# Patient Record
Sex: FEMALE | Race: WHITE | Employment: UNEMPLOYED | URBAN - METROPOLITAN AREA
[De-identification: names, ages, dates, MRNs, and addresses within clinical notes are randomized per-mention and may not be internally consistent; named-entity substitution may affect disease eponyms.]

---

## 2018-05-02 ENCOUNTER — TELEPHONE (OUTPATIENT)
Dept: CARDIOLOGY CLINIC | Age: 43
End: 2018-05-02

## 2018-05-02 RX ORDER — ASPIRIN 81 MG/1
81 TABLET ORAL
COMMUNITY
End: 2019-08-21 | Stop reason: ALTCHOICE

## 2019-08-01 ENCOUNTER — HOSPITAL ENCOUNTER (OUTPATIENT)
Age: 44
Discharge: HOME | End: 2019-08-03

## 2019-08-01 PROCEDURE — 88305 TISSUE EXAM BY PATHOLOGIST: CPT

## 2019-08-19 ENCOUNTER — TELEPHONE (OUTPATIENT)
Dept: ADMINISTRATIVE | Age: 44
End: 2019-08-19

## 2019-08-21 ENCOUNTER — OFFICE VISIT (OUTPATIENT)
Dept: CARDIOLOGY CLINIC | Age: 44
End: 2019-08-21
Payer: COMMERCIAL

## 2019-08-21 VITALS
WEIGHT: 150.5 LBS | DIASTOLIC BLOOD PRESSURE: 60 MMHG | HEIGHT: 66 IN | SYSTOLIC BLOOD PRESSURE: 122 MMHG | RESPIRATION RATE: 16 BRPM | HEART RATE: 72 BPM | BODY MASS INDEX: 24.19 KG/M2

## 2019-08-21 DIAGNOSIS — O09.529 ANTEPARTUM MULTIGRAVIDA OF ADVANCED MATERNAL AGE: Primary | ICD-10-CM

## 2019-08-21 PROCEDURE — 1036F TOBACCO NON-USER: CPT | Performed by: INTERNAL MEDICINE

## 2019-08-21 PROCEDURE — 93000 ELECTROCARDIOGRAM COMPLETE: CPT | Performed by: INTERNAL MEDICINE

## 2019-08-21 PROCEDURE — 99214 OFFICE O/P EST MOD 30 MIN: CPT | Performed by: INTERNAL MEDICINE

## 2019-08-21 PROCEDURE — G8427 DOCREV CUR MEDS BY ELIG CLIN: HCPCS | Performed by: INTERNAL MEDICINE

## 2019-08-21 PROCEDURE — G8420 CALC BMI NORM PARAMETERS: HCPCS | Performed by: INTERNAL MEDICINE

## 2019-08-21 NOTE — PROGRESS NOTES
OUTPATIENT CARDIOLOGY FOLLOW-UP    Name: Sumaya Asif    Age: 53 y.o. Primary Care Physician: Valentino Saliva, DO    Date of Service: 8/21/2019    Chief Complaint:   Chief Complaint   Patient presents with    Cardiac Clearance       Interim History:   Mrs. Anthony David is a 70-year-old female history of PFO, hypertension diet controlled, gestational diabetes and history of dysmenorrhea is here for preop cardiac evaluation. She is scheduled to undergo partial hysterectomy on September 10, 2019. She usually follows with Dr. Haylie Escobar for her cardiology needs. In addition, she has history of incompetent cervix, and history of placenta previa. She was seen in the office 1/15/2018, since her last visit, he has not had any ER visits or hospitalizations. She takes a significant amount of over-the-counter Aleve for abdominal cramps. She denies any active cardiac symptoms such as chest pain, dyspnea, palpitations, syncope or presyncope. She denies history of diabetes, renal failure, strokes, heart attacks, rheumatic fever, or any obstructive valve lesions. She does have good functional capacity and able to walk more than a mile without any chest pain or dyspnea. She is very active at home with her children. She is not taking metoprolol at this time and was able to control her blood pressure with a diet and exercise. No new cardiac complaints since last cardiology evaluation. She denies recent chest pain, SOB, palpitations, lightheadedness, dizziness, syncope, PND, or orthopnea. SR on EKG.     Review of Systems:   Cardiac: As per HPI  General: No fever, chills  Pulmonary: As per HPI  HEENT: No visual disturbances, difficult swallowing  GI: No nausea, vomiting  Endocrine: No thyroid disease or DM  Musculoskeletal: THEODORE x 4, no focal motor deficits  Skin: Intact, no rashes  Neuro/Psych: No headache or seizures    Past Medical History:  Past Medical History:   Diagnosis Date    Abnormal Pap smear of cervix  montelukast (SINGULAIR) 5 MG chewable tablet Take 5 mg by mouth nightly       NAPROXEN PO Take by mouth      Prenatal Vit-Fe Fumarate-FA (PRENATAL 1 PLUS 1) 65-1 MG TABS Take 1 tablet by mouth daily.  aspirin 81 MG EC tablet Take 81 mg by mouth Twice a Week      Pseudoephedrine-Ibuprofen (ADVIL COLD/SINUS PO) Take by mouth as needed      ibuprofen (ADVIL;MOTRIN) 600 MG tablet Take 1 tablet by mouth every 6 hours as needed (Patient not taking: Reported on 8/21/2019) 60 tablet 1    acetaminophen (TYLENOL) 325 MG tablet Take 1 tablet by mouth every 4 hours as needed for Pain. (Patient not taking: Reported on 8/21/2019) 25 tablet 0     No current facility-administered medications for this visit. Physical Exam:  /60   Pulse 72   Resp 16   Ht 5' 6\" (1.676 m)   Wt 150 lb 8 oz (68.3 kg)   BMI 24.29 kg/m²   Wt Readings from Last 3 Encounters:   08/21/19 150 lb 8 oz (68.3 kg)   01/15/18 144 lb (65.3 kg)   01/27/17 141 lb 3.2 oz (64 kg)     Appearance: Awake, alert and oriented x 3, no acute respiratory distress  Skin: Intact, no rash  Head: Normocephalic, atraumatic  Eyes: EOMI, no conjunctival erythema  ENMT: No pharyngeal erythema, MMM, no rhinorrhea  Neck: Supple, no elevated JVP, no carotid bruits  Lungs: Clear to auscultation bilaterally. No wheezes, rales, or rhonchi.   Cardiac: Regular rate and rhythm, +S1S2, no murmurs apparent  Abdomen: Soft, nontender, +bowel sounds  Extremities: Moves all extremities x 4, no lower extremity edema  Neurologic: No focal motor deficits apparent, normal mood and affect, alert and oriented x 3  Peripheral Pulses: Intact posterior tibial pulses bilaterally    Laboratory Tests:  No results found for: CREATININE, BUN, NA, K, CL, CO2  No results found for: MG  Lab Results   Component Value Date    WBC 7.0 11/28/2015    HGB 11.7 11/30/2015    HCT 35.3 11/30/2015    MCV 97.8 11/28/2015     11/28/2015     No results found for: ALT, AST, GGT, ALKPHOS,

## 2019-09-06 ENCOUNTER — HOSPITAL ENCOUNTER (OUTPATIENT)
Age: 44
Discharge: HOME | End: 2019-09-08

## 2019-09-06 LAB
ABO/RH: NORMAL
ANTIBODY SCREEN: NORMAL
HCT VFR BLD CALC: 37.9 % (ref 34–48)
HEMOGLOBIN: 12.2 G/DL (ref 11.5–15.5)
MCH RBC QN AUTO: 32.7 PG (ref 26–35)
MCHC RBC AUTO-ENTMCNC: 32.2 % (ref 32–34.5)
MCV RBC AUTO: 101.6 FL (ref 80–99.9)
PDW BLD-RTO: 12.2 FL (ref 11.5–15)
PLATELET # BLD: 252 E9/L (ref 130–450)
PMV BLD AUTO: 10.9 FL (ref 7–12)
RBC # BLD: 3.73 E12/L (ref 3.5–5.5)
WBC # BLD: 4.6 E9/L (ref 4.5–11.5)

## 2019-09-06 PROCEDURE — 86850 RBC ANTIBODY SCREEN: CPT

## 2019-09-06 PROCEDURE — 86900 BLOOD TYPING SEROLOGIC ABO: CPT

## 2019-09-06 PROCEDURE — 85027 COMPLETE CBC AUTOMATED: CPT

## 2019-09-06 PROCEDURE — 86901 BLOOD TYPING SEROLOGIC RH(D): CPT

## 2019-09-18 ENCOUNTER — HOSPITAL ENCOUNTER (OUTPATIENT)
Age: 44
Discharge: HOME | End: 2019-09-20

## 2019-09-18 PROCEDURE — 88307 TISSUE EXAM BY PATHOLOGIST: CPT

## 2019-09-19 ENCOUNTER — HOSPITAL ENCOUNTER (OUTPATIENT)
Age: 44
Discharge: HOME | End: 2019-09-21

## 2019-09-19 LAB
ANION GAP SERPL CALCULATED.3IONS-SCNC: 10 MMOL/L (ref 7–16)
BUN BLDV-MCNC: 7 MG/DL (ref 6–20)
CALCIUM SERPL-MCNC: 8.7 MG/DL (ref 8.6–10.2)
CHLORIDE BLD-SCNC: 104 MMOL/L (ref 98–107)
CO2: 24 MMOL/L (ref 22–29)
CREAT SERPL-MCNC: 0.7 MG/DL (ref 0.5–1)
GFR AFRICAN AMERICAN: >60
GFR NON-AFRICAN AMERICAN: >60 ML/MIN/1.73
GLUCOSE BLD-MCNC: 109 MG/DL (ref 74–99)
HCT VFR BLD CALC: 31 % (ref 34–48)
HCT VFR BLD CALC: 31.6 % (ref 34–48)
HEMOGLOBIN: 10.1 G/DL (ref 11.5–15.5)
HEMOGLOBIN: 10.2 G/DL (ref 11.5–15.5)
MCH RBC QN AUTO: 32.9 PG (ref 26–35)
MCH RBC QN AUTO: 33.1 PG (ref 26–35)
MCHC RBC AUTO-ENTMCNC: 32.3 % (ref 32–34.5)
MCHC RBC AUTO-ENTMCNC: 32.6 % (ref 32–34.5)
MCV RBC AUTO: 101 FL (ref 80–99.9)
MCV RBC AUTO: 102.6 FL (ref 80–99.9)
PDW BLD-RTO: 12.6 FL (ref 11.5–15)
PDW BLD-RTO: 12.7 FL (ref 11.5–15)
PLATELET # BLD: 185 E9/L (ref 130–450)
PLATELET # BLD: 209 E9/L (ref 130–450)
PMV BLD AUTO: 10.8 FL (ref 7–12)
PMV BLD AUTO: 10.9 FL (ref 7–12)
POTASSIUM SERPL-SCNC: 4.3 MMOL/L (ref 3.5–5)
RBC # BLD: 3.07 E12/L (ref 3.5–5.5)
RBC # BLD: 3.08 E12/L (ref 3.5–5.5)
SODIUM BLD-SCNC: 138 MMOL/L (ref 132–146)
WBC # BLD: 7.8 E9/L (ref 4.5–11.5)
WBC # BLD: 8 E9/L (ref 4.5–11.5)

## 2019-09-19 PROCEDURE — 85027 COMPLETE CBC AUTOMATED: CPT

## 2019-09-19 PROCEDURE — 80048 BASIC METABOLIC PNL TOTAL CA: CPT

## 2020-03-09 ENCOUNTER — TELEPHONE (OUTPATIENT)
Dept: CARDIOLOGY CLINIC | Age: 45
End: 2020-03-09

## 2020-03-09 ENCOUNTER — HOSPITAL ENCOUNTER (EMERGENCY)
Age: 45
Discharge: HOME | End: 2020-03-09
Attending: EMERGENCY MEDICINE
Payer: COMMERCIAL

## 2020-03-09 ENCOUNTER — APPOINTMENT (OUTPATIENT)
Dept: GENERAL RADIOLOGY | Age: 45
End: 2020-03-09
Payer: COMMERCIAL

## 2020-03-09 ENCOUNTER — APPOINTMENT (OUTPATIENT)
Dept: ULTRASOUND IMAGING | Age: 45
End: 2020-03-09
Payer: COMMERCIAL

## 2020-03-09 ENCOUNTER — APPOINTMENT (OUTPATIENT)
Dept: CT IMAGING | Age: 45
End: 2020-03-09
Payer: COMMERCIAL

## 2020-03-09 VITALS
HEART RATE: 89 BPM | OXYGEN SATURATION: 99 % | RESPIRATION RATE: 16 BRPM | DIASTOLIC BLOOD PRESSURE: 78 MMHG | TEMPERATURE: 98.3 F | BODY MASS INDEX: 23.78 KG/M2 | SYSTOLIC BLOOD PRESSURE: 141 MMHG | WEIGHT: 148 LBS | HEIGHT: 66 IN

## 2020-03-09 LAB
ALBUMIN SERPL-MCNC: 4.3 G/DL (ref 3.5–5.2)
ALP BLD-CCNC: 55 U/L (ref 35–104)
ALT SERPL-CCNC: 11 U/L (ref 0–32)
ANION GAP SERPL CALCULATED.3IONS-SCNC: 14 MMOL/L (ref 7–16)
AST SERPL-CCNC: 13 U/L (ref 0–31)
BACTERIA: NORMAL /HPF
BASOPHILS ABSOLUTE: 0.02 E9/L (ref 0–0.2)
BASOPHILS RELATIVE PERCENT: 0.4 % (ref 0–2)
BILIRUB SERPL-MCNC: 0.4 MG/DL (ref 0–1.2)
BILIRUBIN URINE: NEGATIVE
BLOOD, URINE: NORMAL
BUN BLDV-MCNC: 9 MG/DL (ref 6–20)
CALCIUM SERPL-MCNC: 8.9 MG/DL (ref 8.6–10.2)
CHLORIDE BLD-SCNC: 104 MMOL/L (ref 98–107)
CLARITY: CLEAR
CO2: 20 MMOL/L (ref 22–29)
COLOR: YELLOW
CREAT SERPL-MCNC: 0.7 MG/DL (ref 0.5–1)
D DIMER: <200 NG/ML DDU
EKG ATRIAL RATE: 127 BPM
EKG P AXIS: 76 DEGREES
EKG P-R INTERVAL: 114 MS
EKG Q-T INTERVAL: 326 MS
EKG QRS DURATION: 112 MS
EKG QTC CALCULATION (BAZETT): 473 MS
EKG R AXIS: 86 DEGREES
EKG T AXIS: 69 DEGREES
EKG VENTRICULAR RATE: 127 BPM
EOSINOPHILS ABSOLUTE: 0.08 E9/L (ref 0.05–0.5)
EOSINOPHILS RELATIVE PERCENT: 1.5 % (ref 0–6)
GFR AFRICAN AMERICAN: >60
GFR NON-AFRICAN AMERICAN: >60 ML/MIN/1.73
GLUCOSE BLD-MCNC: 112 MG/DL (ref 74–99)
GLUCOSE URINE: NEGATIVE MG/DL
HCG, URINE, POC: NEGATIVE
HCT VFR BLD CALC: 40.2 % (ref 34–48)
HEMOGLOBIN: 13.3 G/DL (ref 11.5–15.5)
IMMATURE GRANULOCYTES #: 0.03 E9/L
IMMATURE GRANULOCYTES %: 0.5 % (ref 0–5)
KETONES, URINE: NEGATIVE MG/DL
LEUKOCYTE ESTERASE, URINE: NEGATIVE
LYMPHOCYTES ABSOLUTE: 1.22 E9/L (ref 1.5–4)
LYMPHOCYTES RELATIVE PERCENT: 22.2 % (ref 20–42)
Lab: NORMAL
MCH RBC QN AUTO: 32.9 PG (ref 26–35)
MCHC RBC AUTO-ENTMCNC: 33.1 % (ref 32–34.5)
MCV RBC AUTO: 99.5 FL (ref 80–99.9)
MONOCYTES ABSOLUTE: 0.36 E9/L (ref 0.1–0.95)
MONOCYTES RELATIVE PERCENT: 6.5 % (ref 2–12)
NEGATIVE QC PASS/FAIL: NORMAL
NEUTROPHILS ABSOLUTE: 3.79 E9/L (ref 1.8–7.3)
NEUTROPHILS RELATIVE PERCENT: 68.9 % (ref 43–80)
NITRITE, URINE: NEGATIVE
PDW BLD-RTO: 12 FL (ref 11.5–15)
PH UA: 7 (ref 5–9)
PLATELET # BLD: 237 E9/L (ref 130–450)
PMV BLD AUTO: 10.4 FL (ref 7–12)
POSITIVE QC PASS/FAIL: NORMAL
POTASSIUM REFLEX MAGNESIUM: 3.8 MMOL/L (ref 3.5–5)
PROTEIN UA: NEGATIVE MG/DL
RBC # BLD: 4.04 E12/L (ref 3.5–5.5)
RBC UA: NORMAL /HPF (ref 0–2)
SODIUM BLD-SCNC: 138 MMOL/L (ref 132–146)
SPECIFIC GRAVITY UA: 1.01 (ref 1–1.03)
TOTAL PROTEIN: 7.3 G/DL (ref 6.4–8.3)
TROPONIN: <0.01 NG/ML (ref 0–0.03)
TSH SERPL DL<=0.05 MIU/L-ACNC: 2.25 UIU/ML (ref 0.27–4.2)
UROBILINOGEN, URINE: 0.2 E.U./DL
WBC # BLD: 5.5 E9/L (ref 4.5–11.5)
WBC UA: NORMAL /HPF (ref 0–5)

## 2020-03-09 PROCEDURE — 93971 EXTREMITY STUDY: CPT

## 2020-03-09 PROCEDURE — 85378 FIBRIN DEGRADE SEMIQUANT: CPT

## 2020-03-09 PROCEDURE — 84484 ASSAY OF TROPONIN QUANT: CPT

## 2020-03-09 PROCEDURE — 85025 COMPLETE CBC W/AUTO DIFF WBC: CPT

## 2020-03-09 PROCEDURE — 71046 X-RAY EXAM CHEST 2 VIEWS: CPT

## 2020-03-09 PROCEDURE — 81001 URINALYSIS AUTO W/SCOPE: CPT

## 2020-03-09 PROCEDURE — 84443 ASSAY THYROID STIM HORMONE: CPT

## 2020-03-09 PROCEDURE — 93005 ELECTROCARDIOGRAM TRACING: CPT | Performed by: EMERGENCY MEDICINE

## 2020-03-09 PROCEDURE — 99285 EMERGENCY DEPT VISIT HI MDM: CPT

## 2020-03-09 PROCEDURE — 80053 COMPREHEN METABOLIC PANEL: CPT

## 2020-03-09 PROCEDURE — 36415 COLL VENOUS BLD VENIPUNCTURE: CPT

## 2020-03-09 PROCEDURE — 93005 ELECTROCARDIOGRAM TRACING: CPT | Performed by: NURSE PRACTITIONER

## 2020-03-09 PROCEDURE — 93010 ELECTROCARDIOGRAM REPORT: CPT | Performed by: INTERNAL MEDICINE

## 2020-03-09 PROCEDURE — 2580000003 HC RX 258: Performed by: NURSE PRACTITIONER

## 2020-03-09 PROCEDURE — 70450 CT HEAD/BRAIN W/O DYE: CPT

## 2020-03-09 RX ORDER — 0.9 % SODIUM CHLORIDE 0.9 %
1000 INTRAVENOUS SOLUTION INTRAVENOUS ONCE
Status: COMPLETED | OUTPATIENT
Start: 2020-03-09 | End: 2020-03-09

## 2020-03-09 RX ADMIN — SODIUM CHLORIDE 1000 ML: 9 INJECTION, SOLUTION INTRAVENOUS at 13:32

## 2020-03-09 NOTE — ED PROVIDER NOTES
ED Attending  CC: Claudia       Department of Emergency Medicine   ED  Provider Note  Admit Date/RoomTime: 3/9/2020 12:52 PM  ED Room:   Chief Complaint:       Palpitations (started this am, states that her heart was racing, lightheaded) and Dizziness    History of Present Illness   Source of history provided by:  patient. History/Exam Limitations: none. Royal Patel is a 50 y.o. old female with has a past medical history of:   Past Medical History:   Diagnosis Date    Abnormal Pap smear of cervix     Anxiety     Diabetes mellitus ([facility] [address]     Endometriosis       presents to the emergency department by private vehicle and ambulatory, with sudden onset of racing and dizziness sensation beginning 1 day(s) prior to arrival.  Her symptoms have been associated with a fluttering sensation and palpitations. There has been NO abdominal pain, AICD firing, chest pain, cough, fatigue, shortness of breath, slow heart beat or syncope. Since onset the symptoms have been persistent and moderate in severity. The symptoms are worsened by none and relieved by nothing. Patient states she drove to [place] on Friday. On Saturday she had an episode of vertigo which she states she has a history of. She states for the rest the day on Saturday she just felt off. She drove home from [place] last night. She noticed last night she had some right calf pain and thought it was a charley horse. She states this morning she felt dizzy and having a racing heart. She denies chest pain but just feels different in the chest. Denies any Shortness of breath, fevers, chills, syncopal episode. HISTORY:0  EC  AGE:1  RISK FACTORS:0  TROPONIN: 0    TOTAL SCORE: 1  ROS    Pertinent positives and negatives are stated within HPI, all other systems reviewed and are negative.     Past Surgical History:   Procedure Laterality Date    APPENDECTOMY      FOOT SURGERY      bunion reconstruction biltaterally     LAPAROSCOPY      TIERNEY  2009   Social History:  reports that she quit smoking about 6 years ago. Her smoking use included cigarettes. She has never used smokeless tobacco. She reports that she does not drink alcohol or use drugs. Family History: family history is not on file. Allergies: Codeine    Physical Exam           ED Triage Vitals   BP Temp Temp Source Pulse Resp SpO2 Height Weight   03/09/20 1243 03/09/20 1243 03/09/20 1243 03/09/20 1229 03/09/20 1243 03/09/20 1229 03/09/20 1243 03/09/20 1243   111/75 98.3 °F (36.8 °C) Oral 129 14 98 % 5' 6\" (1.676 m) 148 lb (67.1 kg)      Oxygen Saturation Interpretation: Normal.    · General Appearance/Constitutional:  Alert, development consistent with age, NAD. · HEENT:  NC/NT. PERRLA. Airway patent. · Neck:  Normal ROM. Supple. · Respiratory:  Clear to auscultation and breath sounds equal.  · CV:     Rate: 120-130. Rhythm: sinus tach. Murmur: none. Heart sounds: Normal S1, S2.  · GI:  Abdomen Soft, nontender, good bowel sounds. No firm or pulsatile mass. · Integument:  Normal turgor. Warm, dry, without visible rash. · Lymphatic/Ext.:  Edema:  non-pitting Bilateral lower extremity(s). · Neurological:  Oriented. Motor functions intact.     Lab / Imaging Results   (All laboratory and radiology results have been personally reviewed by myself)  Labs:  Results for orders placed or performed during the hospital encounter of 03/09/20   CBC Auto Differential   Result Value Ref Range    WBC 5.5 4.5 - 11.5 E9/L    RBC 4.04 3.50 - 5.50 E12/L    Hemoglobin 13.3 11.5 - 15.5 g/dL    Hematocrit 40.2 34.0 - 48.0 %    MCV 99.5 80.0 - 99.9 fL    MCH 32.9 26.0 - 35.0 pg    MCHC 33.1 32.0 - 34.5 %    RDW 12.0 11.5 - 15.0 fL    Platelets 072 931 - 727 E9/L    MPV 10.4 7.0 - 12.0 fL    Neutrophils % 68.9 43.0 - 80.0 %    Immature Granulocytes % 0.5 0.0 - 5.0 %    Lymphocytes % 22.2 20.0 - 42.0 %    Monocytes % 6.5 2.0 - 12.0 %    Eosinophils % 1.5 0.0 - 6.0 % Pass/Fail Pass    EKG 12 Lead   Result Value Ref Range    Ventricular Rate 127 BPM    Atrial Rate 127 BPM    P-R Interval 114 ms    QRS Duration 112 ms    Q-T Interval 326 ms    QTc Calculation (Bazett) 473 ms    P Axis 76 degrees    R Axis 86 degrees    T Axis 69 degrees   EKG 12 Lead   Result Value Ref Range    Ventricular Rate 89 BPM    Atrial Rate 89 BPM    P-R Interval 134 ms    QRS Duration 108 ms    Q-T Interval 372 ms    QTc Calculation (Bazett) 452 ms    P Axis 57 degrees    R Axis 45 degrees    T Axis 54 degrees       Imaging: All Radiology results interpreted by Radiologist unless otherwise noted. US DUP LOWER EXTREMITY RIGHT HERBERTH   Final Result      No evidence to suggest deep venous thrombosis of the right lower   extremity. XR CHEST STANDARD (2 VW)   Final Result   Normal chest               CT Head WO Contrast   Final Result   No acute intracranial process           EKG #1:  Interpreted by emergency department physician unless otherwise noted. Time:  1235    Rate: 127  Rhythm: Sinus. Interpretation:Sinus tach, No STEMI    EKG: #  This EKG is signed and interpreted by Dr. Maya Stern. Rate: 89  Rhythm: Sinus  Interpretation: Sinus rhythm with sinus arrhythmia. No STEMI      ED Course / Medical Decision Making     Medications   0.9 % sodium chloride bolus (0 mLs Intravenous Stopped 3/9/20 1526)        Re-Evaluations:  3/9/20      Time: 1547-Dr. Shea Long and myself reevaluated patient. She denies having any chest pain. Getting with her PCP for follow-up care unfortunately her PCP is out of town until Thursday. Patient states that she has a cardiologist Dr. Iglesia Morgan that she is calling to have further evaluation. Discussed today that she may need to wear a Holter monitor for. Repeat EKG was completed patient's heart rate is now in the 80s in sinus rhythm. With occasional arrhythmia  Patients symptoms are improving.     Consultations:             IP CONSULT TO INTERNAL stable during their ED course. Plan   Discharge instructions and follow up care  Patient condition is stable. New Medications     New Prescriptions    No medications on file     Electronically signed by DEON Yin CNP   DD: 3/9/20  **This report was transcribed using voice recognition software. Every effort was made to ensure accuracy; however, inadvertent computerized transcription errors may be present.   END OF PROVIDER NOTE     DEON Yin CNP  03/09/20 [id number]

## 2020-03-10 LAB
EKG ATRIAL RATE: 89 BPM
EKG P AXIS: 57 DEGREES
EKG P-R INTERVAL: 134 MS
EKG Q-T INTERVAL: 372 MS
EKG QRS DURATION: 108 MS
EKG QTC CALCULATION (BAZETT): 452 MS
EKG R AXIS: 45 DEGREES
EKG T AXIS: 54 DEGREES
EKG VENTRICULAR RATE: 89 BPM

## 2020-03-10 PROCEDURE — 93010 ELECTROCARDIOGRAM REPORT: CPT | Performed by: INTERNAL MEDICINE

## 2020-03-13 ENCOUNTER — NURSE ONLY (OUTPATIENT)
Dept: CARDIOLOGY CLINIC | Age: 45
End: 2020-03-13

## 2020-03-13 NOTE — PROGRESS NOTES
Patient was seen in the office for the placement of a 30 day [employer] monitor per . Patient tolerated well and understood instructions.      CRISTAL MINOR

## 2020-03-13 NOTE — TELEPHONE ENCOUNTER
Patient notified of Dr. Eleuterio Ott recommendations. Monitor scheduled for today at 2:20 p.m. Order placed for echo. Patient will call upon completion of monitor to schedule F/U.

## 2020-04-13 ENCOUNTER — TELEPHONE (OUTPATIENT)
Dept: CARDIOLOGY CLINIC | Age: 45
End: 2020-04-13

## 2020-06-11 ENCOUNTER — TELEPHONE (OUTPATIENT)
Dept: CARDIOLOGY | Age: 45
End: 2020-06-11

## 2020-06-12 ENCOUNTER — TELEPHONE (OUTPATIENT)
Dept: CARDIOLOGY | Age: 45
End: 2020-06-12

## 2020-08-03 ENCOUNTER — TELEPHONE (OUTPATIENT)
Dept: CARDIOLOGY | Age: 45
End: 2020-08-03

## 2020-08-06 ENCOUNTER — TELEPHONE (OUTPATIENT)
Dept: CARDIOLOGY | Age: 45
End: 2020-08-06

## 2020-08-10 ENCOUNTER — HOSPITAL ENCOUNTER (OUTPATIENT)
Dept: CARDIOLOGY | Age: 45
Discharge: HOME | End: 2020-08-10
Payer: COMMERCIAL

## 2020-08-10 LAB
LV EF: 58 %
LVEF MODALITY: NORMAL

## 2020-08-10 PROCEDURE — 93306 TTE W/DOPPLER COMPLETE: CPT | Performed by: PSYCHIATRY & NEUROLOGY

## 2020-08-18 ENCOUNTER — TELEPHONE (OUTPATIENT)
Dept: CARDIOLOGY CLINIC | Age: 45
End: 2020-08-18

## 2020-08-18 NOTE — TELEPHONE ENCOUNTER
----- Message from Alex Woodall DO sent at 8/18/2020  3:46 PM EDT -----  Let her know that her heart function and valves look good.

## 2020-08-27 ENCOUNTER — OFFICE VISIT (OUTPATIENT)
Dept: CARDIOLOGY CLINIC | Age: 45
End: 2020-08-27
Payer: COMMERCIAL

## 2020-08-27 VITALS
RESPIRATION RATE: 14 BRPM | HEART RATE: 74 BPM | DIASTOLIC BLOOD PRESSURE: 78 MMHG | SYSTOLIC BLOOD PRESSURE: 102 MMHG | HEIGHT: 66 IN | WEIGHT: 148 LBS | BODY MASS INDEX: 23.78 KG/M2

## 2020-08-27 PROCEDURE — G8427 DOCREV CUR MEDS BY ELIG CLIN: HCPCS | Performed by: INTERNAL MEDICINE

## 2020-08-27 PROCEDURE — 99213 OFFICE O/P EST LOW 20 MIN: CPT | Performed by: INTERNAL MEDICINE

## 2020-08-27 PROCEDURE — G8420 CALC BMI NORM PARAMETERS: HCPCS | Performed by: INTERNAL MEDICINE

## 2020-08-27 PROCEDURE — 93000 ELECTROCARDIOGRAM COMPLETE: CPT | Performed by: INTERNAL MEDICINE

## 2020-08-27 PROCEDURE — 1036F TOBACCO NON-USER: CPT | Performed by: INTERNAL MEDICINE

## 2020-08-27 RX ORDER — M-VIT,TX,IRON,MINS/CALC/FOLIC 27MG-0.4MG
1 TABLET ORAL DAILY
COMMUNITY

## 2020-08-27 NOTE — PROGRESS NOTES
OUTPATIENT CARDIOLOGY FOLLOW-UP    Name: Belen Wheeler    Age: 50 y.o. Primary Care Physician: Bossman Duran DO    Date of Service: 8/27/2020    Chief Complaint:   Chief Complaint   Patient presents with    3 Month Follow-Up       Interim History:   Mrs. Axel Wu is a 68-year-old female history of PFO, hypertension diet controlled, gestational diabetes and history of dysmenorrhea is here for preop cardiac evaluation. She is scheduled to undergo partial hysterectomy on September 10, 2019. She usually follows with Dr. Amanda Tapia for her cardiology needs. In addition, she has history of incompetent cervix, and history of placenta previa. She was seen in the office 8/21/20219, since her last visit, he has not had any ER visits or hospitalizations. She underwent hysterectomy without any perioperative cardiac events. She gets occasional palpitations and feels fluttering in her chest without any heart racing. The symptoms happens when she is more anxious. She works out 3 times a week but gets dizziness and was recently diagnosed with vertigo. Because of exertional dizziness she is worried about exercising on regular basis. She takes meclizine as needed for vertigo. She denies any active cardiac symptoms such as chest pain, dyspnea, palpitations, syncope or presyncope. She denies history of diabetes, renal failure, strokes, heart attacks, rheumatic fever, or any obstructive valve lesions. She is not taking metoprolol at this time and was able to control her blood pressure with a diet and exercise. She does not want take any medication for palpitations. She had a 30-day Holter monitor in March 2020 which showed no significant arrhythmias or pauses. No new cardiac complaints since last cardiology evaluation. She denies recent chest pain, SOB, palpitations, lightheadedness, dizziness, syncope, PND, or orthopnea. SR on EKG.     Review of Systems:   Cardiac: As per HPI  General: No fever, chills  Pulmonary: As per HPI  HEENT: No visual disturbances, difficult swallowing  GI: No nausea, vomiting  Endocrine: No thyroid disease or DM  Musculoskeletal: THEODORE x 4, no focal motor deficits  Skin: Intact, no rashes  Neuro/Psych: No headache or seizures    Past Medical History:  Past Medical History:   Diagnosis Date    Abnormal Pap smear of cervix     Anxiety     Diabetes mellitus ([facility] [address]     Endometriosis        Past Surgical History:  Past Surgical History:   Procedure Laterality Date    APPENDECTOMY      FOOT SURGERY      bunion reconstruction biltaterally     LAPAROSCOPY      LEEP  2009       Family History:  History reviewed. No pertinent family history.     Social History:  Social History     Socioeconomic History    Marital status:      Spouse name: Adilene Godoy Number of children: 2    Years of education: 15    Highest education level: Not on file   Occupational History    Not on file   Social Needs    Financial resource strain: Not on file    Food insecurity     Worry: Not on file     Inability: Not on file   [employer] needs     Medical: Not on file     Non-medical: Not on file   Tobacco Use    Smoking status: Former Smoker     Types: Cigarettes     Last attempt to quit: 2013     Years since quittin.7    Smokeless tobacco: Never Used   Substance and Sexual Activity    Alcohol use: No    Drug use: No    Sexual activity: Not Currently     Partners: Male   Lifestyle    Physical activity     Days per week: Not on file     Minutes per session: Not on file    Stress: Not on file   Relationships    Social connections     Talks on phone: Not on file     Gets together: Not on file     Attends Judaism service: Not on file     Active member of club or organization: Not on file     Attends meetings of clubs or organizations: Not on file     Relationship status: Not on file    Intimate partner violence     Fear of current or ex partner: Not on file     Emotionally abused: Not on file     Physically abused: Not on file     Forced sexual activity: Not on file   Other Topics Concern    Not on file   Social History Narrative    Not on file       Allergies: Allergies   Allergen Reactions    Codeine Other (See Comments)     Dizzy        Current Medications:  Current Outpatient Medications   Medication Sig Dispense Refill    Multiple Vitamins-Minerals (THERAPEUTIC MULTIVITAMIN-MINERALS) tablet Take 1 tablet by mouth daily      Cyanocobalamin (VITAMIN B 12 PO) Take by mouth      Cholecalciferol (VITAMIN D3) 125 MCG (5000 UT) TABS Take by mouth      ibuprofen (ADVIL;MOTRIN) 600 MG tablet Take 1 tablet by mouth every 6 hours as needed 60 tablet 1    montelukast (SINGULAIR) 5 MG chewable tablet Take 5 mg by mouth nightly       NAPROXEN PO Take by mouth      Prenatal Vit-Fe Fumarate-FA (PRENATAL 1 PLUS 1) 65-1 MG TABS Take 1 tablet by mouth daily. No current facility-administered medications for this visit. Physical Exam:  /78   Pulse 74   Resp 14   Ht 5' 6\" (1.676 m)   Wt 148 lb (67.1 kg)   BMI 23.89 kg/m²   Wt Readings from Last 3 Encounters:   08/27/20 148 lb (67.1 kg)   03/09/20 148 lb (67.1 kg)   08/21/19 150 lb 8 oz (68.3 kg)     Appearance: Awake, alert and oriented x 3, no acute respiratory distress  Skin: Intact, no rash  Head: Normocephalic, atraumatic  Eyes: EOMI, no conjunctival erythema  ENMT: No pharyngeal erythema, MMM, no rhinorrhea  Neck: Supple, no elevated JVP, no carotid bruits  Lungs: Clear to auscultation bilaterally. No wheezes, rales, or rhonchi.   Cardiac: Regular rate and rhythm, +S1S2, no murmurs apparent  Abdomen: Soft, nontender, +bowel sounds  Extremities: Moves all extremities x 4, no lower extremity edema  Neurologic: No focal motor deficits apparent, normal mood and affect, alert and oriented x 3  Peripheral Pulses: Intact posterior tibial pulses bilaterally    Laboratory Tests:  Lab Results   Component Value Date    CREATININE patient's current medication list, allergies, problem list and results of all previously ordered testing were reviewed at today's visit.     Natividad Owens MD  [facility]) Cardiology

## 2020-09-28 ENCOUNTER — TELEPHONE (OUTPATIENT)
Dept: CARDIOLOGY | Age: 45
End: 2020-09-28

## 2020-09-28 NOTE — TELEPHONE ENCOUNTER
CALLED PATIENT AND LEFT MESSAGE TO SCHEDULE REG STRESS TEST.     Electronically signed by King Steen on 9/28/2020 at 8:42 AM

## 2020-11-11 ENCOUNTER — TELEPHONE (OUTPATIENT)
Dept: ADMINISTRATIVE | Age: 45
End: 2020-11-11

## 2020-11-11 NOTE — TELEPHONE ENCOUNTER
Pt returned call to RS appt/ DR Estevan Horner, she is not able to come at the open times bc she has a 3 yr old, she is doing fine and will call back after the first of the year to schedule appt

## 2020-11-18 ENCOUNTER — TELEPHONE (OUTPATIENT)
Dept: CARDIOLOGY | Age: 45
End: 2020-11-18

## 2020-11-18 NOTE — TELEPHONE ENCOUNTER
Left message to schedule treadmill test.  Electronically signed by La Garcia on 11/18/2020 at 10:44 AM

## 2020-11-19 ENCOUNTER — TELEPHONE (OUTPATIENT)
Dept: CARDIOLOGY | Age: 45
End: 2020-11-19

## 2020-11-19 NOTE — TELEPHONE ENCOUNTER
SCHEDULED TREADMILL STRESS TEST FOR 12-08-20. REVIEWED COVID CHECKLIST WITH PATIENT.     Electronically signed by Alissa Soni on 11/19/2020 at 11:41 AM

## 2020-12-04 ENCOUNTER — TELEPHONE (OUTPATIENT)
Dept: CARDIOLOGY | Age: 45
End: 2020-12-04

## 2020-12-07 ENCOUNTER — TELEPHONE (OUTPATIENT)
Dept: CARDIOLOGY | Age: 45
End: 2020-12-07

## 2020-12-07 NOTE — TELEPHONE ENCOUNTER
Patient canceled treadmill. The patient had sore throat over the weekend, not wanting to expose herself to Covid.   Electronically signed by Jas Buenrostro on 12/7/2020 at 8:13 AM

## 2021-07-09 ENCOUNTER — TELEPHONE (OUTPATIENT)
Dept: CARDIOLOGY | Age: 46
End: 2021-07-09

## 2022-08-09 ENCOUNTER — HOSPITAL ENCOUNTER (EMERGENCY)
Age: 47
Discharge: HOME | End: 2022-08-09
Payer: COMMERCIAL

## 2022-08-09 ENCOUNTER — APPOINTMENT (OUTPATIENT)
Dept: GENERAL RADIOLOGY | Age: 47
End: 2022-08-09
Payer: COMMERCIAL

## 2022-08-09 VITALS
HEIGHT: 66 IN | HEART RATE: 90 BPM | DIASTOLIC BLOOD PRESSURE: 77 MMHG | SYSTOLIC BLOOD PRESSURE: 129 MMHG | BODY MASS INDEX: 23.63 KG/M2 | WEIGHT: 147 LBS | RESPIRATION RATE: 15 BRPM | TEMPERATURE: 97.5 F | OXYGEN SATURATION: 100 %

## 2022-08-09 DIAGNOSIS — S93.401A SPRAIN OF RIGHT ANKLE, UNSPECIFIED LIGAMENT, INITIAL ENCOUNTER: Primary | ICD-10-CM

## 2022-08-09 PROCEDURE — 6360000002 HC RX W HCPCS: Performed by: PHYSICIAN ASSISTANT

## 2022-08-09 PROCEDURE — 73630 X-RAY EXAM OF FOOT: CPT

## 2022-08-09 PROCEDURE — 99284 EMERGENCY DEPT VISIT MOD MDM: CPT

## 2022-08-09 PROCEDURE — 73610 X-RAY EXAM OF ANKLE: CPT

## 2022-08-09 PROCEDURE — 6370000000 HC RX 637 (ALT 250 FOR IP): Performed by: PHYSICIAN ASSISTANT

## 2022-08-09 PROCEDURE — 90471 IMMUNIZATION ADMIN: CPT | Performed by: PHYSICIAN ASSISTANT

## 2022-08-09 PROCEDURE — 90714 TD VACC NO PRESV 7 YRS+ IM: CPT | Performed by: PHYSICIAN ASSISTANT

## 2022-08-09 RX ORDER — IBUPROFEN 600 MG/1
600 TABLET ORAL ONCE
Status: COMPLETED | OUTPATIENT
Start: 2022-08-09 | End: 2022-08-09

## 2022-08-09 RX ORDER — TETANUS AND DIPHTHERIA TOXOIDS ADSORBED 2; 2 [LF]/.5ML; [LF]/.5ML
0.5 INJECTION INTRAMUSCULAR ONCE
Status: COMPLETED | OUTPATIENT
Start: 2022-08-09 | End: 2022-08-09

## 2022-08-09 RX ADMIN — TETANUS AND DIPHTHERIA TOXOIDS ADSORBED 0.5 ML: 2; 2 INJECTION INTRAMUSCULAR at 21:54

## 2022-08-09 RX ADMIN — IBUPROFEN 600 MG: 600 TABLET, FILM COATED ORAL at 21:20

## 2022-08-09 ASSESSMENT — PAIN DESCRIPTION - LOCATION
LOCATION: ANKLE
LOCATION: ANKLE

## 2022-08-09 ASSESSMENT — PAIN DESCRIPTION - ORIENTATION
ORIENTATION: RIGHT
ORIENTATION: RIGHT

## 2022-08-09 ASSESSMENT — PAIN SCALES - GENERAL: PAINLEVEL_OUTOF10: 10

## 2022-08-10 NOTE — ED PROVIDER NOTES
[facility]                                                                                                                                    Department of Emergency Medicine   ED  Provider Note  Admit Date/RoomTime: 8/9/2022  9:06 PM  ED Room: 36/36        HPI:  8/9/22,   Time: 9:11 PM EDT         Niki Laird is a 50 y.o. female presenting to the ED for slip and fall in mud, beginning just prior to arrival.  The complaint has been persistent, moderate in severity, and worsened by walking. The patient states she was pulled by her dog and slipped in mud. States she twisted right ankle and fell. [place] a \"pop\" as well. Pt states she has pain diffusely around ankle. Reports she is unable to bear weight. No head injury or LOC. Denies pain in right knee. Patient states she did not yet take anything for pain at home. ROS:     Constitutional: Negative for fever and chills  HENT: Negative for ear pain, sore throat and sinus pressure  Eyes: Negative for pain, discharge and redness  Respiratory:  Negative for shortness of breath, cough and wheezing  Cardiovascular: Negative for CP, edema or palpitations  Gastrointestinal: Negative for nausea, vomiting, diarrhea and abdominal distention  Genitourinary: Negative for dysuria and frequency  Musculoskeletal: Negative for back pain and arthralgia  Skin: Negative for rash and wound  Neurological: Negative for weakness and headaches  Hematological: Negative for adenopathy    All other systems reviewed and are negative      -------------------------------- PAST HISTORY ----------------------------------  Past Medical History:  has a past medical history of Abnormal Pap smear of cervix, Anxiety, Diabetes mellitus ([facility] [address] and Endometriosis. Past Surgical History:  has a past surgical history that includes LEEP (2009); laparoscopy; Foot surgery; and Appendectomy. Social History:  reports that she quit smoking about 8 years ago.  Her smoking use included cigarettes. She has never used smokeless tobacco. She reports that she does not drink alcohol and does not use drugs. Family History: family history is not on file. The patients home medications have been reviewed. Allergies: Codeine    --------------------------------- RESULTS ------------------------------------------  All laboratory and radiology results have been personally reviewed by myself   LABS:  No results found for this visit on 08/09/22. RADIOLOGY:  Interpreted by Radiologist.  XR ANKLE RIGHT (MIN 3 VIEWS)   Final Result   1. No acute fracture involving right ankle or right foot. 2. Internal fixating wires with healing fracture involving 1st metatarsal   bone. XR FOOT RIGHT (MIN 3 VIEWS)   Final Result   1. No acute fracture involving right ankle or right foot. 2. Internal fixating wires with healing fracture involving 1st metatarsal   bone.             ----------------- NURSING NOTES AND VITALS REVIEWED ---------------   The nursing notes within the ED encounter and vital signs as below have been reviewed. /77   Pulse 90   Temp 97.5 °F (36.4 °C)   Resp 15   Ht 5' 6\" (1.676 m)   Wt 147 lb (66.7 kg)   SpO2 100%   BMI 23.73 kg/m²   Oxygen Saturation Interpretation: Normal      --------------------------------PHYSICAL EXAM------------------------------------      Constitutional/General: Alert and oriented x3, well appearing, non toxic in NAD  Head: NC/AT  Eyes: PERRL, EOMI  Mouth: Oropharynx clear, handling secretions, no trismus  Neck: Supple, full ROM, no meningeal signs  Pulmonary: Lungs clear to auscultation bilaterally, no wheezes, rales, or rhonchi. Not in respiratory distress  Cardiovascular:  Regular rate and rhythm, no murmurs, gallops, or rubs. 2+ distal pulses  Extremities: Moves all extremities x 4. Exam of right foot/ankle with mild swelling just anterior to lateral malleolus. Point tender at site. No bony pain over right medial malleolus. ROM intact but uncomfortable. No pain palpable over right knee. Pulses and sensation intact. Warm and well perfused  Skin:  Tiny avulsion seen medial surface right great toe. Skin debrided with scissors after cleansing. Neurologic: GCS 15,  Intact. No focal deficits  Psych: Normal Affect      ------------------------ ED COURSE/MEDICAL DECISION MAKING----------------------  Medications   diptheria-tetanus toxoids (DECAVAC) 2-2 LF/0.5ML injection 0.5 mL (has no administration in time range)   ibuprofen (ADVIL;MOTRIN) tablet 600 mg (600 mg Oral Given 8/9/22 2120)         Medical Decision Making:    X-rays of right ankle and foot demonstrate no acute fracture or bony changes. Pt reassured. Plan ace wrap and ankle strap for home with crutches. Td updated. F/U with Ortho or podiatry as needed if persistent complaints. Pt aware and agreeable to plan       Counseling: The emergency provider has spoken with the patient and discussed todays results, in addition to providing specific details for the plan of care and counseling regarding the diagnosis and prognosis. Questions are answered at this time and they are agreeable with the plan.      ------------------------ IMPRESSION AND DISPOSITION -------------------------------    IMPRESSION  1.  Sprain of right ankle, unspecified ligament, initial encounter        DISPOSITION  Disposition: Discharge to home  Patient condition is stable                   Carolyn Wilkinson PA-C  08/09/22 [id number]